# Patient Record
Sex: FEMALE | Race: AMERICAN INDIAN OR ALASKA NATIVE
[De-identification: names, ages, dates, MRNs, and addresses within clinical notes are randomized per-mention and may not be internally consistent; named-entity substitution may affect disease eponyms.]

---

## 2018-10-04 ENCOUNTER — HOSPITAL ENCOUNTER (EMERGENCY)
Dept: HOSPITAL 5 - ED | Age: 33
LOS: 1 days | Discharge: HOME | End: 2018-10-05
Payer: MEDICAID

## 2018-10-04 VITALS — SYSTOLIC BLOOD PRESSURE: 126 MMHG | DIASTOLIC BLOOD PRESSURE: 85 MMHG

## 2018-10-04 DIAGNOSIS — F17.200: ICD-10-CM

## 2018-10-04 DIAGNOSIS — N93.9: Primary | ICD-10-CM

## 2018-10-04 DIAGNOSIS — N92.0: ICD-10-CM

## 2018-10-04 LAB
BASOPHILS # (AUTO): 0.1 K/MM3 (ref 0–0.1)
BASOPHILS NFR BLD AUTO: 0.7 % (ref 0–1.8)
EOSINOPHIL # BLD AUTO: 0.1 K/MM3 (ref 0–0.4)
EOSINOPHIL NFR BLD AUTO: 0.8 % (ref 0–4.3)
HCT VFR BLD CALC: 39.2 % (ref 30.3–42.9)
HGB BLD-MCNC: 13 GM/DL (ref 10.1–14.3)
LYMPHOCYTES # BLD AUTO: 3 K/MM3 (ref 1.2–5.4)
LYMPHOCYTES NFR BLD AUTO: 37.1 % (ref 13.4–35)
MCH RBC QN AUTO: 29 PG (ref 28–32)
MCHC RBC AUTO-ENTMCNC: 33 % (ref 30–34)
MCV RBC AUTO: 87 FL (ref 79–97)
MONOCYTES # (AUTO): 0.5 K/MM3 (ref 0–0.8)
MONOCYTES % (AUTO): 6.6 % (ref 0–7.3)
PLATELET # BLD: 193 K/MM3 (ref 140–440)
RBC # BLD AUTO: 4.53 M/MM3 (ref 3.65–5.03)

## 2018-10-04 PROCEDURE — 86901 BLOOD TYPING SEROLOGIC RH(D): CPT

## 2018-10-04 PROCEDURE — 86850 RBC ANTIBODY SCREEN: CPT

## 2018-10-04 PROCEDURE — 85025 COMPLETE CBC W/AUTO DIFF WBC: CPT

## 2018-10-04 PROCEDURE — 84702 CHORIONIC GONADOTROPIN TEST: CPT

## 2018-10-04 PROCEDURE — 86900 BLOOD TYPING SEROLOGIC ABO: CPT

## 2018-10-04 PROCEDURE — 36415 COLL VENOUS BLD VENIPUNCTURE: CPT

## 2018-10-04 PROCEDURE — 99283 EMERGENCY DEPT VISIT LOW MDM: CPT

## 2018-10-05 NOTE — EMERGENCY DEPARTMENT REPORT
HPI





- General


Chief Complaint: Vaginal Bleeding


Time Seen by Provider: 10/05/18 00:39





- HPI


HPI: 





The patient is a 33-year-old female with a significant history of irregular 

menstrual period, who presents for evaluation of vaginal bleeding.  The patient 

reports consistent moderate in severity vaginal bleeding for the past 10 days, 

associated with cramping no abdominal pain on and off, resolved currently. The 

patient denies fever, chills, night sweats, diarrhea, blood in the stool, dark 

tarry stool, dysuria, hematuria, flank pain, genital discharge, inability to 

pass flatus. 





ED Past Medical Hx





- Past Medical History


Previous Medical History?: No


Hx Seizures: No





- Surgical History


Past Surgical History?: Yes


Additional Surgical History: c sec, breast augmentation





- Social History


Smoking Status: Current Some Day Smoker


Substance Use Type: None





- Medications


Home Medications: 


 Home Medications











 Medication  Instructions  Recorded  Confirmed  Last Taken  Type


 


Norgestimate-Ethinyl Estradiol 1 tab PO DAILY 11/21/13 11/21/13 11/24/13 17:00 

History





[Previfem]     


 


Ferrous Sulfate [Iron] 325 mg PO BID #60 tablet 10/05/18  Unknown Rx


 


HYDROcodone/APAP 5-325 [[place] 1 each PO Q6HR PRN #15 tablet 10/05/18  Unknown Rx





5/325]     


 


medroxyPROGESTERone ACETATE 5 mg PO QDAY #7 tablet 10/05/18  Unknown Rx





[Provera]     














ED Review of Systems


ROS: 


Stated complaint: HEAVY BLEEDING


Other details as noted in HPI








Constitutional: denies: fever


ENT: denies: throat or neck pain


Respiratory: denies: cough, shortness of breath


Cardiovascular: denies: chest pain


Endocrine: denies unexplained weight loss or gain


Gastrointestinal: denies: abdominal pain, nausea


Genitourinary: reports vb denies: dysuria


Musculoskeletal: denies: leg swelling


Skin: denies: rash


Neurological: denies: headache


Hematological/Lymphatic: denies: easy bleeding or easy bruising  


Psych: denies sadness or hopelessness











Physical Exam





- Physical Exam


Vital Signs: 


 Vital Signs











  10/04/18





  20:51


 


Temperature 98.4 F


 


Pulse Rate 86


 


Respiratory 16





Rate 


 


Blood Pressure 126/85


 


O2 Sat by Pulse 98





Oximetry 











Physical Exam: 








General: well-nourished, well-developed, no acute distress


Head: Normocephalic, atraumatic


Eyes: normal sclera


ENT: Mucous membranes are pink and moist 


Neck: trachea midline, neck supple, No neck stiffness, no cervical adenopathy


Respiratory: Breath sounds equal bilaterally, no wheezing, rales, or rhonchi


Cardio: S1 and S2 present, no murmurs, rubs, gallops, capillary refill is brisk


Abdomen: Normoactive bowel sounds, soft abdomen, no  tenderness


Musc: No pitting edema


Skin: No rash


Neuro: no facial drooping, normal speech


Psych: Normal affect











ED Course


 Vital Signs











  10/04/18





  20:51


 


Temperature 98.4 F


 


Pulse Rate 86


 


Respiratory 16





Rate 


 


Blood Pressure 126/85


 


O2 Sat by Pulse 98





Oximetry 














ED Medical Decision Making





- Lab Data


Result diagrams: 


 10/04/18 21:03








- Medical Decision Making





The patient was seen and examined by myself.  The patient is placed on a 

cardiac monitor and continuous pulse ox. On initial evaluation, the patient was 

found to be in no distress. Evaluation orders were placed. Lab results are 

reassuring including non-concerning levels of RBC, hemoglobin, hematocrit, 

negative pregnancy test. The patient was reevaluated and reported that their 

symptoms were improved.  As the patient has not concerning levels of RBC, 

hemoglobin, hematocrit, and platelets, with normal vital signs, and there is no 

evidence of severe bleeding requiring transfusion or other emergent management 

at this time, the patient is stable for discharge with outpatient follow-up. 

The patient is given follow-up and return instructions.  The patient expressed 

understanding and agreed with the plan.  The patient is discharged in stable 

condition.





Critical care attestation.: 


If time is entered above; I have spent that time in minutes in the direct care 

of this critically ill patient, excluding procedure time.








ED Disposition


Clinical Impression: 


 Vaginal bleeding, Menorrhagia with irregular cycle





Disposition: DC-01 TO HOME OR SELFCARE


Is pt being admited?: Yes


Does the pt Need Aspirin: Yes


Condition: Stable


Instructions:  Dysfunctional Uterine Bleeding (ED), Menorrhagia (ED)


Prescriptions: 


Ferrous Sulfate [Iron] 325 mg PO BID #60 tablet


HYDROcodone/APAP 5-325 [[place] 5/325] 1 each PO Q6HR PRN #15 tablet


 PRN Reason: Pain


medroxyPROGESTERone ACETATE [Provera] 5 mg PO QDAY #7 tablet


Referrals: 


DEISY CALDERON MD [Staff Physician] - 3-5 Days


[facility] Days


KEYANNA VERONICA MD [Staff Physician] - 3-5 Days


DEISY OBREGON MD [Staff Physician] - 3-5 Days


Time of Disposition: 02:27